# Patient Record
Sex: FEMALE | URBAN - METROPOLITAN AREA
[De-identification: names, ages, dates, MRNs, and addresses within clinical notes are randomized per-mention and may not be internally consistent; named-entity substitution may affect disease eponyms.]

---

## 2022-11-28 ENCOUNTER — OFFICE VISIT (OUTPATIENT)
Dept: URBAN - METROPOLITAN AREA CLINIC 83 | Facility: CLINIC | Age: 72
End: 2022-11-28
Payer: MEDICARE

## 2022-11-28 DIAGNOSIS — H35.3122 NONEXUDATIVE AGE-RELATED MACULAR DEGENERATION, LEFT EYE, INTERMEDIATE DRY STAGE: ICD-10-CM

## 2022-11-28 DIAGNOSIS — H35.3211 EXUDATIVE AGE-RELATED MACULAR DEGENERATION, RIGHT EYE, WITH ACTIVE CHOROIDAL NEOVASCULARIZATION: Primary | ICD-10-CM

## 2022-11-28 DIAGNOSIS — H25.13 AGE-RELATED NUCLEAR CATARACT, BILATERAL: ICD-10-CM

## 2022-11-28 PROCEDURE — 67028 INJECTION EYE DRUG: CPT | Performed by: OPHTHALMOLOGY

## 2022-11-28 PROCEDURE — 99204 OFFICE O/P NEW MOD 45 MIN: CPT | Performed by: OPHTHALMOLOGY

## 2022-11-28 PROCEDURE — 92134 CPTRZ OPH DX IMG PST SGM RTA: CPT | Performed by: OPHTHALMOLOGY

## 2022-11-28 ASSESSMENT — INTRAOCULAR PRESSURE
OD: 12
OS: 14

## 2022-11-28 NOTE — IMPRESSION/PLAN
Impression: Exudative age-related macular degeneration, right eye, with active choroidal neovascularization: H35.3211.
s/p Avastin x 3, last 08/24/22 Plan: --exam/OCT reveal CNVM with SRF and mild SRH
--findings d/w pt, rec restarting anti-VEGF to maintain vision  
--r/b/a of Avastin for NVAMD discussed --pt understands Avastin is considered off-label for NVAMD
--pt elects to cont Avastin under OCT guidance --Avastin 1.25 mg/.05 ml injected w/o complication --pt instructed to call immediately with s/s VA loss/pain RTC 6-8 weeks for OCT OU re-eval

## 2022-11-28 NOTE — IMPRESSION/PLAN
Impression: Nonexudative age-related macular degeneration, left eye, intermediate dry stage: H35.3122. Plan: --no evidence of exudation on exam or OCT OS
--findings/diagnosis discussed with pt in detail
--AREDS-2 vitamins and [employer] grid self-monitoring reviewed
--dietary recs, avoid smoking, UV protection discussed --pt to call with s/s decreased vision/metamorphopsia

## 2023-01-23 ENCOUNTER — OFFICE VISIT (OUTPATIENT)
Facility: LOCATION | Age: 73
End: 2023-01-23
Payer: MEDICARE

## 2023-01-23 DIAGNOSIS — H35.3211 EXUDATIVE AGE-RELATED MACULAR DEGENERATION, RIGHT EYE, WITH ACTIVE CHOROIDAL NEOVASCULARIZATION: Primary | ICD-10-CM

## 2023-01-23 DIAGNOSIS — H35.3122 NONEXUDATIVE AGE-RELATED MACULAR DEGENERATION, LEFT EYE, INTERMEDIATE DRY STAGE: ICD-10-CM

## 2023-01-23 DIAGNOSIS — H25.13 AGE-RELATED NUCLEAR CATARACT, BILATERAL: ICD-10-CM

## 2023-01-23 PROCEDURE — 67028 INJECTION EYE DRUG: CPT | Performed by: OPHTHALMOLOGY

## 2023-01-23 PROCEDURE — 92134 CPTRZ OPH DX IMG PST SGM RTA: CPT | Performed by: OPHTHALMOLOGY

## 2023-01-23 ASSESSMENT — INTRAOCULAR PRESSURE
OD: 16
OS: 17

## 2023-01-23 NOTE — IMPRESSION/PLAN
Impression: Exudative age-related macular degeneration, right eye, with active choroidal neovascularization: H35.3211.
s/p Avastin x 4, last 11/28/22 Plan: --exam/OCT reveal CNVM with stable SRF and resolved SRH
--findings d/w pt, rec cont anti-VEGF to maintain vision  
--r/b/a of Avastin for NVAMD discussed --pt understands Avastin is considered off-label for NVAMD
--pt elects to cont Avastin under OCT guidance --Avastin 1.25 mg/.05 ml injected w/o complication --pt instructed to call immediately with s/s VA loss/pain RTC 8 weeks for OCT OU re-eval for Avastin

## 2023-03-20 ENCOUNTER — PROCEDURE (OUTPATIENT)
Facility: LOCATION | Age: 73
End: 2023-03-20
Payer: MEDICARE

## 2023-03-20 DIAGNOSIS — H25.13 AGE-RELATED NUCLEAR CATARACT, BILATERAL: ICD-10-CM

## 2023-03-20 DIAGNOSIS — H35.3122 NONEXUDATIVE AGE-RELATED MACULAR DEGENERATION, LEFT EYE, INTERMEDIATE DRY STAGE: ICD-10-CM

## 2023-03-20 DIAGNOSIS — H35.3211 EXUDATIVE AGE-RELATED MACULAR DEGENERATION, RIGHT EYE, WITH ACTIVE CHOROIDAL NEOVASCULARIZATION: Primary | ICD-10-CM

## 2023-03-20 PROCEDURE — 67028 INJECTION EYE DRUG: CPT | Performed by: OPHTHALMOLOGY

## 2023-03-20 PROCEDURE — 92134 CPTRZ OPH DX IMG PST SGM RTA: CPT | Performed by: OPHTHALMOLOGY

## 2023-03-20 ASSESSMENT — INTRAOCULAR PRESSURE
OD: 18
OS: 19

## 2023-03-20 NOTE — IMPRESSION/PLAN
Impression: Exudative age-related macular degeneration, right eye, with active choroidal neovascularization: H35.3211.
s/p Avastin x 5, last 1/23/23 Plan: --exam/OCT reveal CNVM with improving SRF and resolved SRH
--findings d/w pt, rec cont anti-VEGF to maintain vision  
--r/b/a of Avastin for NVAMD discussed --pt understands Avastin is considered off-label for NVAMD
--pt elects to cont Avastin under OCT guidance --Avastin 1.25 mg/.05 ml injected w/o complication --pt instructed to call immediately with s/s VA loss/pain

8 weeks - Dr. Adi rBown ([place])

## 2023-12-11 ENCOUNTER — OFFICE VISIT (OUTPATIENT)
Dept: URBAN - METROPOLITAN AREA CLINIC 86 | Facility: CLINIC | Age: 73
End: 2023-12-11
Payer: MEDICARE

## 2023-12-11 DIAGNOSIS — H25.13 AGE-RELATED NUCLEAR CATARACT, BILATERAL: ICD-10-CM

## 2023-12-11 DIAGNOSIS — H35.3122 NONEXUDATIVE AGE-RELATED MACULAR DEGENERATION, LEFT EYE, INTERMEDIATE DRY STAGE: ICD-10-CM

## 2023-12-11 PROCEDURE — 67028 INJECTION EYE DRUG: CPT | Performed by: OPHTHALMOLOGY

## 2023-12-11 PROCEDURE — 99214 OFFICE O/P EST MOD 30 MIN: CPT | Performed by: OPHTHALMOLOGY

## 2023-12-11 PROCEDURE — 92134 CPTRZ OPH DX IMG PST SGM RTA: CPT | Performed by: OPHTHALMOLOGY

## 2023-12-11 ASSESSMENT — INTRAOCULAR PRESSURE
OD: 13
OS: 12

## 2024-01-26 ENCOUNTER — OFFICE VISIT (OUTPATIENT)
Dept: URBAN - METROPOLITAN AREA CLINIC 86 | Facility: CLINIC | Age: 74
End: 2024-01-26
Payer: MEDICARE

## 2024-01-26 DIAGNOSIS — H35.3211 EXUDATIVE AGE-RELATED MACULAR DEGENERATION, RIGHT EYE, WITH ACTIVE CHOROIDAL NEOVASCULARIZATION: Primary | ICD-10-CM

## 2024-01-26 PROCEDURE — 92134 CPTRZ OPH DX IMG PST SGM RTA: CPT | Performed by: OPHTHALMOLOGY

## 2024-01-26 PROCEDURE — 67028 INJECTION EYE DRUG: CPT | Performed by: OPHTHALMOLOGY

## 2024-01-26 ASSESSMENT — INTRAOCULAR PRESSURE
OD: 13
OS: 14

## 2024-03-22 ENCOUNTER — OFFICE VISIT (OUTPATIENT)
Dept: URBAN - METROPOLITAN AREA CLINIC 86 | Facility: CLINIC | Age: 74
End: 2024-03-22
Payer: MEDICARE

## 2024-03-22 DIAGNOSIS — H35.3211 EXUDATIVE AGE-RELATED MACULAR DEGENERATION, RIGHT EYE, WITH ACTIVE CHOROIDAL NEOVASCULARIZATION: Primary | ICD-10-CM

## 2024-03-22 PROCEDURE — 92134 CPTRZ OPH DX IMG PST SGM RTA: CPT | Performed by: OPHTHALMOLOGY

## 2024-03-22 PROCEDURE — 67028 INJECTION EYE DRUG: CPT | Performed by: OPHTHALMOLOGY

## 2024-03-22 ASSESSMENT — INTRAOCULAR PRESSURE
OS: 13
OD: 13